# Patient Record
Sex: MALE | Race: WHITE | Employment: STUDENT | URBAN - NONMETROPOLITAN AREA
[De-identification: names, ages, dates, MRNs, and addresses within clinical notes are randomized per-mention and may not be internally consistent; named-entity substitution may affect disease eponyms.]

---

## 2017-07-11 ENCOUNTER — OFFICE VISIT (OUTPATIENT)
Dept: FAMILY MEDICINE | Facility: OTHER | Age: 22
End: 2017-07-11
Attending: PHYSICIAN ASSISTANT
Payer: COMMERCIAL

## 2017-07-11 VITALS
HEART RATE: 64 BPM | TEMPERATURE: 96.8 F | DIASTOLIC BLOOD PRESSURE: 70 MMHG | RESPIRATION RATE: 16 BRPM | OXYGEN SATURATION: 97 % | BODY MASS INDEX: 25.03 KG/M2 | HEIGHT: 74 IN | WEIGHT: 195 LBS | SYSTOLIC BLOOD PRESSURE: 116 MMHG

## 2017-07-11 DIAGNOSIS — Z00.00 ROUTINE HISTORY AND PHYSICAL EXAMINATION OF ADULT: ICD-10-CM

## 2017-07-11 DIAGNOSIS — Z71.89 ACP (ADVANCE CARE PLANNING): Chronic | ICD-10-CM

## 2017-07-11 DIAGNOSIS — J40 BRONCHITIS: Primary | ICD-10-CM

## 2017-07-11 PROCEDURE — 99395 PREV VISIT EST AGE 18-39: CPT | Performed by: PHYSICIAN ASSISTANT

## 2017-07-11 RX ORDER — AZITHROMYCIN 250 MG/1
TABLET, FILM COATED ORAL
Qty: 6 TABLET | Refills: 0 | Status: SHIPPED | OUTPATIENT
Start: 2017-07-11 | End: 2017-08-22

## 2017-07-11 ASSESSMENT — ANXIETY QUESTIONNAIRES
5. BEING SO RESTLESS THAT IT IS HARD TO SIT STILL: NOT AT ALL
3. WORRYING TOO MUCH ABOUT DIFFERENT THINGS: NOT AT ALL
2. NOT BEING ABLE TO STOP OR CONTROL WORRYING: NOT AT ALL
1. FEELING NERVOUS, ANXIOUS, OR ON EDGE: NOT AT ALL
6. BECOMING EASILY ANNOYED OR IRRITABLE: NOT AT ALL
GAD7 TOTAL SCORE: 0
7. FEELING AFRAID AS IF SOMETHING AWFUL MIGHT HAPPEN: NOT AT ALL

## 2017-07-11 ASSESSMENT — PAIN SCALES - GENERAL: PAINLEVEL: NO PAIN (0)

## 2017-07-11 ASSESSMENT — PATIENT HEALTH QUESTIONNAIRE - PHQ9: 5. POOR APPETITE OR OVEREATING: NOT AT ALL

## 2017-07-11 NOTE — NURSING NOTE
"Chief Complaint   Patient presents with     Physical     has a form to sign for [employer]     *_* Health Care Directive *_*     declined info       Initial /70 (BP Location: Right arm, Patient Position: Chair, Cuff Size: Adult Large)  Pulse 64  Temp 96.8  F (36  C) (Tympanic)  Resp 16  Ht 6' 2.25\" (1.886 m)  Wt 195 lb (88.5 kg)  SpO2 97%  BMI 24.87 kg/m2 Estimated body mass index is 24.87 kg/(m^2) as calculated from the following:    Height as of this encounter: 6' 2.25\" (1.886 m).    Weight as of this encounter: 195 lb (88.5 kg).  Medication Reconciliation: complete   Brit Hickman LPN      "

## 2017-07-11 NOTE — MR AVS SNAPSHOT
"              After Visit Summary   7/11/2017    Pako Nair    MRN: [id number]           Patient Information     Date Of Birth          1995        Visit Information        Provider Department      7/11/2017 2:30 PM Zaria Linton PA [facility]        Today's Diagnoses     Bronchitis    -  1    ACP (advance care planning)        Routine history and physical examination of adult           Follow-ups after your visit        Who to contact     If you have questions or need follow up information about today's clinic visit or your schedule please contact [facility] directly at [phone].  Normal or non-critical lab and imaging results will be communicated to you by MyChart, letter or phone within 4 business days after the clinic has received the results. If you do not hear from us within 7 days, please contact the clinic through MyChart or phone. If you have a critical or abnormal lab result, we will notify you by phone as soon as possible.  Submit refill requests through [employer] or call your pharmacy and they will forward the refill request to us. Please allow 3 business days for your refill to be completed.          Additional Information About Your Visit        Care EveryWhere ID     This is your Care EveryWhere ID. This could be used by other organizations to access your [place] medical records  [id number]        Your Vitals Were     Pulse Temperature Respirations Height Pulse Oximetry BMI (Body Mass Index)    64 96.8  F (36  C) (Tympanic) 16 6' 2.25\" (1.886 m) 97% 24.87 kg/m2       Blood Pressure from Last 3 Encounters:   07/11/17 116/70   07/14/16 104/64   08/07/15 104/68    Weight from Last 3 Encounters:   07/11/17 195 lb (88.5 kg)   07/14/16 200 lb (90.7 kg)   08/07/15 190 lb (86.2 kg)              Today, you had the following     No orders found for display         Today's Medication Changes          These changes are accurate as of: 7/11/17  3:33 PM.  If you have " any questions, ask your nurse or doctor.               Start taking these medicines.        Dose/Directions    azithromycin 250 MG tablet   Commonly known as:  ZITHROMAX   Used for:  Bronchitis   Started by:  Zaria Linton PA        Two tablets first day, then one tablet daily for four days.   Quantity:  6 tablet   Refills:  0            Where to get your medicines      These medications were sent to [id number] - [place], [place] - [address]   [address] , [place] [address]     Phone:  [phone]     azithromycin 250 MG tablet                Primary Care Provider Office Phone # Fax #    Pratik Morse [phone]       [facility] [address]  [facility] [address]        Equal Access to Services     ZAHEER ASH : Hadii luke ku hadasho Soomaali, waaxda luqadaha, qaybta kaalmada adeegyada, waxay matin haysabinen nabeel harrison . So [facility] [phone].    ATENCIÓN: Si habla español, tiene a wyman disposición servicios gratuitos de asistencia lingüística. LlSelect [facility] [phone].    We comply with applicable federal civil rights laws and [place] laws. We do not discriminate on the basis of race, color, national origin, age, disability sex, sexual orientation or gender identity.            Thank you!     Thank you for choosing [facility]  for your care. Our goal is always to provide you with excellent care. Hearing back from our patients is one way we can continue to improve our services. Please take a few minutes to complete the written survey that you may receive in the mail after your visit with us. Thank you!             Your Updated Medication List - Protect others around you: Learn how to safely use, store and throw away your medicines at www.disposemymeds.org.          This list is accurate as of: 7/11/17  3:33 PM.  Always use your most recent med list.                   Brand Name Dispense Instructions for use Diagnosis    azithromycin 250 MG tablet     ZITHROMAX    6 tablet    Two tablets first day, then one tablet daily for four days.    Bronchitis

## 2017-07-11 NOTE — PROGRESS NOTES
Subjective:  Pako Nair is a 22 year old male who presents for annual physical exam. 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 He has 2 week hisotry of cough, nasal congestion. No fever    Past Medical History:   Diagnosis Date     Routine infant or child health check 6/13/2000     Unspecified otitis media 3/1/2001       History reviewed. No pertinent surgical history.    Family History   Problem Relation Age of Onset     HEART DISEASE Maternal Grandfather        Social History   Substance Use Topics     Smoking status: Never Smoker     Smokeless tobacco: Never Used     Alcohol use No       No current outpatient prescriptions on file.     No current facility-administered medications for this visit.        No Known Allergies    Review of Systems:  Gen: negative for fever, chills, change in weight  Derm: negative for worrisome rashes, moles or lesions  Eyes: negative for vision changes or irritation  ENT: negative for ear, mouth and throat problems  Resp: negative for SOB  CV: negative for chest pain, palpitations   GI: negative for nausea, abdominal pain, heartburn, or change in bowel habits  : negative for dysuria, hematuria  Musculoskeletal: negative for significant arthralgias or myalgia  Neuro: negative for weakness, dizziness or paresthesias  Endo: negative for temperature intolerance, skin/hair changes  Psych: negative for changes in mood or affect    Objective:  B/P: 116/70, T: 96.8, P: 64, R: 16  195 lbs 0 oz Body mass index is 24.87 kg/(m^2).           Physical Exam:  Constitutional: healthy, alert and no distress  Skin: No suspicious rash or skin lesion  Head: Normocephalic. No masses, lesions, tenderness or abnormalities  ENT: Oropharynx moist and pink. EAC's and TM's intact. No cervical lymphadenopathy. No thyromegaly  CV: RRR. No murmur. No pretibial edema  Pulm: Scattered rhonchi. No rales or wheeze  GI: Abdomen soft, non-tender. BS normal. No masses, organomegaly. No rebound or guarding.No  hernia  Musculoskeletal: extremities normal- no gross deformities noted, gait normal and normal muscle tone  Neuroc: Gait normal. Reflexes normal and symmetric. Sensation grossly WNL. CN 2-12 intact  Psych: Euthymic mood with corresponding affect.        Assessment and Plan:    (J40) Bronchitis  (primary encounter diagnosis)  Plan: azithromycin (ZITHROMAX) 250 MG tablet            (Z71.89) ACP (advance care planning)      (Z00.00) Routine history and physical examination of adult  Comment: Healthy, other than URI  Plan: Form completed for college      Zaria Linton PA-C

## 2017-07-12 ASSESSMENT — PATIENT HEALTH QUESTIONNAIRE - PHQ9: SUM OF ALL RESPONSES TO PHQ QUESTIONS 1-9: 0

## 2017-07-12 ASSESSMENT — ANXIETY QUESTIONNAIRES: GAD7 TOTAL SCORE: 0

## 2017-08-22 ENCOUNTER — OFFICE VISIT (OUTPATIENT)
Dept: FAMILY MEDICINE | Facility: OTHER | Age: 22
End: 2017-08-22
Attending: FAMILY MEDICINE
Payer: COMMERCIAL

## 2017-08-22 VITALS
HEART RATE: 53 BPM | HEIGHT: 74 IN | TEMPERATURE: 98.3 F | RESPIRATION RATE: 16 BRPM | BODY MASS INDEX: 25.03 KG/M2 | SYSTOLIC BLOOD PRESSURE: 102 MMHG | DIASTOLIC BLOOD PRESSURE: 70 MMHG | OXYGEN SATURATION: 99 % | WEIGHT: 195 LBS

## 2017-08-22 DIAGNOSIS — N52.9 ERECTILE DYSFUNCTION, UNSPECIFIED ERECTILE DYSFUNCTION TYPE: Primary | ICD-10-CM

## 2017-08-22 PROCEDURE — 99213 OFFICE O/P EST LOW 20 MIN: CPT | Performed by: FAMILY MEDICINE

## 2017-08-22 RX ORDER — SILDENAFIL 100 MG/1
50-100 TABLET, FILM COATED ORAL DAILY PRN
Qty: 3 TABLET | Refills: 1 | Status: SHIPPED | OUTPATIENT
Start: 2017-08-22

## 2017-08-22 ASSESSMENT — ANXIETY QUESTIONNAIRES
GAD7 TOTAL SCORE: 0
5. BEING SO RESTLESS THAT IT IS HARD TO SIT STILL: NOT AT ALL
7. FEELING AFRAID AS IF SOMETHING AWFUL MIGHT HAPPEN: NOT AT ALL
4. TROUBLE RELAXING: NOT AT ALL
2. NOT BEING ABLE TO STOP OR CONTROL WORRYING: NOT AT ALL
1. FEELING NERVOUS, ANXIOUS, OR ON EDGE: NOT AT ALL
3. WORRYING TOO MUCH ABOUT DIFFERENT THINGS: NOT AT ALL
6. BECOMING EASILY ANNOYED OR IRRITABLE: NOT AT ALL

## 2017-08-22 ASSESSMENT — PAIN SCALES - GENERAL: PAINLEVEL: NO PAIN (0)

## 2017-08-22 ASSESSMENT — PATIENT HEALTH QUESTIONNAIRE - PHQ9: SUM OF ALL RESPONSES TO PHQ QUESTIONS 1-9: 0

## 2017-08-22 NOTE — PROGRESS NOTES
Pako Nair    August 22, 2017    Chief Complaint   Patient presents with     Abdominal Pain     intermittent pain during exercise       SUBJECTIVE:  Patient has some ED at times.  Only prior to intercourse.  He can achieve erection easily unless he has to perform.  No trouble if he had a couple of drinks.  No trouble with foreplay, just the actual act.  We discussed options.  See below.     Past Medical History:   Diagnosis Date     Routine infant or child health check 6/13/2000     Unspecified otitis media 3/1/2001       History reviewed. No pertinent surgical history.    Current Outpatient Prescriptions   Medication Sig Dispense Refill     sildenafil (VIAGRA) 100 MG cap/tab Take 0.5-1 tablets ( mg) by mouth daily as needed for erectile dysfunction Take 30 min to 4 hours before intercourse.  Never use with nitroglycerin, terazosin or doxazosin. 3 tablet 1       No Known Allergies    Family History   Problem Relation Age of Onset     HEART DISEASE Maternal Grandfather        Social History     Social History     Marital status: Single     Spouse name: N/A     Number of children: N/A     Years of education: N/A     Occupational History     Not on file.     Social History Main Topics     Smoking status: Never Smoker     Smokeless tobacco: Never Used     Alcohol use No     Drug use: No     Sexual activity: Yes     Partners: Female     Birth control/ protection: Condom     Other Topics Concern     Parent/Sibling W/ Cabg, Mi Or Angioplasty Before 65f 55m? No     Social History Narrative    Age 17    Primary language English               5 point ROS negative except as noted above in HPI, including Gen., Resp., CV, GI &  system review.     OBJECTIVE:  B/P: 102/70, T: 98.3, P: 53, R: 16    GENERAL APPEARANCE: Alert, no acute distress  Genitals:  Normal male.  Normal testes.  Penis normal.  No hernias.    SKIN: no suspicious lesions or rashes to visualized skin  NEURO: Alert, oriented x 3, speech and mentation  normal    ASSESSMENT and PLAN:  (N52.9) Erectile dysfunction, unspecified erectile dysfunction type  (primary encounter diagnosis)  Comment: situational.   Plan: sildenafil (VIAGRA) 100 MG cap/tab        Discussed options.  Counseling, meds for anxiety all discussed and doesn't seem right to me or Sergio.  Going to go with a trial of viagra, minimal, to see if he can get through the psychological difficulties of anxiety with intercourse.  He will f/u with ongoing concerns.

## 2017-08-22 NOTE — NURSING NOTE
"Chief Complaint   Patient presents with     Abdominal Pain     intermittent pain during exercise       Initial /70 (BP Location: Right arm, Patient Position: Sitting, Cuff Size: Adult Large)  Pulse 53  Temp 98.3  F (36.8  C) (Tympanic)  Resp 16  Ht 6' 2\" (1.88 m)  Wt 195 lb (88.5 kg)  SpO2 99%  BMI 25.04 kg/m2 Estimated body mass index is 25.04 kg/(m^2) as calculated from the following:    Height as of this encounter: 6' 2\" (1.88 m).    Weight as of this encounter: 195 lb (88.5 kg).  Medication Reconciliation: complete     Jacqui Hill      "

## 2017-08-23 ASSESSMENT — ANXIETY QUESTIONNAIRES: GAD7 TOTAL SCORE: 0

## 2018-04-11 ENCOUNTER — HOSPITAL ENCOUNTER (EMERGENCY)
Facility: HOSPITAL | Age: 23
Discharge: HOME | End: 2018-04-11
Attending: PHYSICIAN ASSISTANT | Admitting: PHYSICIAN ASSISTANT
Payer: COMMERCIAL

## 2018-04-11 ENCOUNTER — APPOINTMENT (OUTPATIENT)
Dept: GENERAL RADIOLOGY | Facility: HOSPITAL | Age: 23
End: 2018-04-11
Attending: PHYSICIAN ASSISTANT
Payer: COMMERCIAL

## 2018-04-11 VITALS
RESPIRATION RATE: 16 BRPM | SYSTOLIC BLOOD PRESSURE: 126 MMHG | OXYGEN SATURATION: 98 % | DIASTOLIC BLOOD PRESSURE: 78 MMHG | TEMPERATURE: 96.6 F

## 2018-04-11 DIAGNOSIS — M62.830 BACK MUSCLE SPASM: ICD-10-CM

## 2018-04-11 PROCEDURE — 25000128 H RX IP 250 OP 636: Performed by: PHYSICIAN ASSISTANT

## 2018-04-11 PROCEDURE — 72100 X-RAY EXAM L-S SPINE 2/3 VWS: CPT | Mod: TC

## 2018-04-11 PROCEDURE — 96372 THER/PROPH/DIAG INJ SC/IM: CPT

## 2018-04-11 PROCEDURE — 99213 OFFICE O/P EST LOW 20 MIN: CPT | Performed by: PHYSICIAN ASSISTANT

## 2018-04-11 PROCEDURE — G0463 HOSPITAL OUTPT CLINIC VISIT: HCPCS | Mod: 25

## 2018-04-11 PROCEDURE — 25000132 ZZH RX MED GY IP 250 OP 250 PS 637: Performed by: PHYSICIAN ASSISTANT

## 2018-04-11 RX ORDER — CYCLOBENZAPRINE HCL 10 MG
TABLET ORAL
Qty: 20 TABLET | Refills: 0 | Status: SHIPPED | OUTPATIENT
Start: 2018-04-11

## 2018-04-11 RX ORDER — DIAZEPAM 5 MG
10 TABLET ORAL ONCE
Status: COMPLETED | OUTPATIENT
Start: 2018-04-11 | End: 2018-04-11

## 2018-04-11 RX ORDER — PREDNISONE 20 MG/1
TABLET ORAL
Qty: 10 TABLET | Refills: 0 | Status: SHIPPED | OUTPATIENT
Start: 2018-04-11

## 2018-04-11 RX ORDER — KETOROLAC TROMETHAMINE 30 MG/ML
60 INJECTION, SOLUTION INTRAMUSCULAR; INTRAVENOUS ONCE
Status: COMPLETED | OUTPATIENT
Start: 2018-04-11 | End: 2018-04-11

## 2018-04-11 RX ADMIN — DIAZEPAM 10 MG: 5 TABLET ORAL at 14:33

## 2018-04-11 RX ADMIN — KETOROLAC TROMETHAMINE 60 MG: 30 INJECTION, SOLUTION INTRAMUSCULAR at 14:33

## 2018-04-11 ASSESSMENT — ENCOUNTER SYMPTOMS
MYALGIAS: 1
PSYCHIATRIC NEGATIVE: 1
CARDIOVASCULAR NEGATIVE: 1
BACK PAIN: 1
CONSTITUTIONAL NEGATIVE: 1

## 2018-04-11 NOTE — ED AVS SNAPSHOT
[facility]    [address]    [facility] [address]    Phone:  [phone]                                       Pako Nair   MRN: [id number]    Department:  [facility]   Date of Visit:  4/11/2018           Patient Information     Date Of Birth          1995        Your diagnoses for this visit were:     Back muscle spasm        You were seen by Marylou William PA.      Follow-up Information     Follow up with Pratik Morse MD.    Specialty:  Family Practice    Why:  If symptoms worsen or if not resolving. A Chiropractor may a good idea, as well.    Contact information:    [address] [facility] [address] [phone]          Follow up with [facility].    Specialty:  EMERGENCY MEDICINE    Why:  If further concerns develop    Contact information:    [address] [address] [phone]    Additional information:    From [place]: Take [address]. Turn left at [address]. Turn left at the first stoplight on [address]. At the first stop sign, take a right onto [address]. Take a left into the parking lot and continue through until you reach the North enterance of the building.       From [place]: Take [address]. Take the [address] ramp towards [place]. Turn left onto [address]. Take a slight right onto [address] [facility]. Turn left at the first stoplight on [address]. At the first stop sign, take a right onto [address]. Take a left into the parking lot and continue through until you reach the North enterance of the building.       From [place]: Take [address]. Take a right at [address]. At the first stop sign, take a right onto [address]. Take a left into the parking lot and continue through until you reach the North enterance of the building.       Discharge References/Attachments     BACK SAFETY: POOR POSTURE HURTS (ENGLISH)    BACK  SPASM, NO TRAUMA (ENGLISH)    BACK, CARING FOR THROUGHOUT THE DAY (ENGLISH)    BASICS OF GOOD POSTURE, BACK SAFETY: (ENGLISH)         Review of your medicines      START taking        Dose / Directions Last dose taken    cyclobenzaprine 10 MG tablet   Commonly known as:  FLEXERIL   Quantity:  20 tablet        Take half to one tablet every 8 hours. Do not drive after taking.   Refills:  0        predniSONE 20 MG tablet   Commonly known as:  DELTASONE   Quantity:  10 tablet        Take two tablets (= 40mg) each day for 5 (five) days   Refills:  0          Our records show that you are taking the medicines listed below. If these are incorrect, please call your family doctor or clinic.        Dose / Directions Last dose taken    sildenafil 100 MG tablet   Commonly known as:  VIAGRA   Dose:   mg   Quantity:  3 tablet        Take 0.5-1 tablets ( mg) by mouth daily as needed for erectile dysfunction Take 30 min to 4 hours before intercourse.  Never use with nitroglycerin, terazosin or doxazosin.   Refills:  1                Prescriptions were sent or printed at these locations (2 Prescriptions)                   [address] [address] [facility] [address]   [address] [address] [address]    Telephone:  [phone]   Fax:  [phone]   Hours:                  E-Prescribed (2 of 2)         cyclobenzaprine (FLEXERIL) 10 MG tablet               predniSONE (DELTASONE) 20 MG tablet                Procedures and tests performed during your visit     Lumbar spine XR, 2-3 views      Orders Needing Specimen Collection     None      Pending Results     No orders found from 4/9/2018 to 4/12/2018.            Pending Culture Results     No orders found from 4/9/2018 to 4/12/2018.            Thank you for choosing [facility]       Thank you for choosing [facility] for your care. Our goal is always to provide you with excellent care. Hearing back from our patients is one way we can continue to improve our services.  "Please take a few minutes to complete the written survey that you may receive in the mail after you visit with us. Thank you!        [employer] Information     [employer] lets you send messages to your doctor, view your test results, renew your prescriptions, schedule appointments and more. To sign up, go to [email or web address] . Click on \"Log in\" on the left side of the screen, which will take you to the Welcome page. Then click on \"Sign up Now\" on the right side of the page.     You will be asked to enter the access code listed below, as well as some personal information. Please follow the directions to create your username and password.     Your access code is: [email or web address]  Expires: 7/10/2018  2:44 PM     Your access code will  in 90 days. If you need help or a new code, please call your [place] clinic or [phone].        Care EveryWhere ID     This is your Care EveryWhere ID. This could be used by other organizations to access your [place] medical records  [id number]        Equal Access to Services     [facility] C. Frem[facility] HospitalRACHEL : Hadii luke William, wamatt osman, qazay pickens, airam harrison . So [facility] [phone].    ATENCIÓN: Si habla español, tiene a wyman disposición servicios gratuitos de asistencia lingüística. Llame al [phone].    We comply with applicable federal civil rights laws and [place] laws. We do not discriminate on the basis of race, color, national origin, age, disability, sex, sexual orientation, or gender identity.            After Visit Summary       This is your record. Keep this with you and show to your community pharmacist(s) and doctor(s) at your next visit.                  "

## 2018-04-11 NOTE — ED AVS SNAPSHOT
[facility]    [address]    [place] [address]    Phone:  [phone]                                       Pako Nair   MRN: [id number]    Department:  [facility]   Date of Visit:  4/11/2018           After Visit Summary Signature Page     I have received my discharge instructions, and my questions have been answered. I have discussed any challenges I see with this plan with the nurse or doctor.    ..........................................................................................................................................  Patient/Patient Representative Signature      ..........................................................................................................................................  Patient Representative Print Name and Relationship to Patient    ..................................................               ................................................  Date                                            Time    ..........................................................................................................................................  Reviewed by Signature/Title    ...................................................              ..............................................  Date                                                            Time

## 2018-04-11 NOTE — ED NOTES
Pt presents today alone for c/o back pain after lifting weights and his back went out admits he did take 1 tylenol #3 since this happened.

## 2018-04-11 NOTE — ED PROVIDER NOTES
History     Chief Complaint   Patient presents with     Back Pain     c/o lower back pain x 1 hr, notes lifting wts     The history is provided by the patient. No  was used.     Pako Nair is a 22 year old male who has acute LBP. He was just lifting weights and he buckled to his knees when he was doing a standing lift.  no loss of leg strength. No loss of b/b control. No head or neck pan/injury.  States he has to walk slow and get up and out of chair very slow, due to the LBP. States he took a T#3 1 hour ago for the pain.    Problem List:    Patient Active Problem List    Diagnosis Date Noted     ACP (advance care planning) 07/11/2017     Priority: Medium     Advance Care Planning 7/11/2017: ACP Review of Chart / Resources Provided:  Reviewed chart for advance care plan.  Pako Nair has no plan or code status on file. Discussed available resources and provided with information.   Added by Brit Hickman      Advance Care Planning 7/14/2016: ACP Review of Chart / Resources Provided:  Reviewed chart for advance care plan.  Pako Nair has no plan or code status on file. Discussed available resources and provided with information. Confirmed code status reflects current choices pending further ACP discussions.  Confirmed/documented legally designated decision makers.  Added by Brti Hickman                Past Medical History:    Past Medical History:   Diagnosis Date     Routine infant or child health check 6/13/2000     Unspecified otitis media 3/1/2001       Past Surgical History:    History reviewed. No pertinent surgical history.    Family History:    Family History   Problem Relation Age of Onset     HEART DISEASE Maternal Grandfather        Social History:  Marital Status:  Single [1]  Social History   Substance Use Topics     Smoking status: Never Smoker     Smokeless tobacco: Never Used     Alcohol use No        Medications:      cyclobenzaprine (FLEXERIL) 10 MG tablet    predniSONE (DELTASONE) 20 MG tablet   sildenafil (VIAGRA) 100 MG cap/tab         Review of Systems   Constitutional: Negative.    Cardiovascular: Negative.    Musculoskeletal: Positive for back pain, gait problem and myalgias.   Psychiatric/Behavioral: Negative.        Physical Exam   BP: 126/78  Heart Rate: 86  Temp: 96.6  F (35.9  C)  Resp: 16  SpO2: 98 %      Physical Exam   Constitutional: He is oriented to person, place, and time. He appears well-developed and well-nourished. No distress.   Cardiovascular: Normal rate.    Pulmonary/Chest: Effort normal.   Musculoskeletal:   Back: no e/e/e/e, moderate TTP to mid lumbar muscles, bilaterally. M/n/v intact. Pt guarding against flexion    BLE: +AFROM, 5/5 strength, m/n/v intact   Neurological: He is alert and oriented to person, place, and time.   Skin: He is not diaphoretic.   Psychiatric: He has a normal mood and affect.   Nursing note and vitals reviewed.      ED Course     ED Course     Procedures          Results for orders placed or performed during the hospital encounter of 04/11/18 (from the past 24 hour(s))   Lumbar spine XR, 2-3 views    Narrative    Exam: XR LUMBAR SPINE 2-3 VIEWS     History:Male, age 22 years, pain, was dead lifting weights when the  pain started today;     Comparison:  None    Technique: Three views are submitted.    Findings: Bones are normally mineralized. No evidence of acute or  subacute fracture. No evidence of acute subluxation.           Impression    Impression:  1.  No evidence of acute or subacute bony abnormality.     2.  Lumbar spine straightening suggesting muscle spasm.    LAUREEN DUMONT MD       Medications   ketorolac (TORADOL) injection 60 mg (60 mg Intramuscular Given 4/11/18 1433)   diazepam (VALIUM) tablet 10 mg (10 mg Oral Given 4/11/18 1433)   pt observed x 20 minutes. Pt tolerated well        Assessments & Plan (with Medical Decision Making)     I have reviewed the nursing notes.    I have reviewed the  findings, diagnosis, plan and need for follow up with the patient.      Discharge Medication List as of 4/11/2018  2:44 PM      START taking these medications    Details   cyclobenzaprine (FLEXERIL) 10 MG tablet Take half to one tablet every 8 hours. Do not drive after taking., Disp-20 tablet, R-0, E-Prescribe      predniSONE (DELTASONE) 20 MG tablet Take two tablets (= 40mg) each day for 5 (five) days, Disp-10 tablet, R-0, E-Prescribe             Final diagnoses:   Back muscle spasm           Patient verbally educated and given appropriate education sheets for the diagnoses and has no questions.  Take medications as directed.   Follow up with your Primary Care provider if symptoms increase or if not resolving in next week.  You also may benefit for an adjustment by your Chiropractor.  if further concerns develop, return to the ER  Marylou William Certified  Physician Assistant  4/11/2018  4:22 PM  URGENT CARE CLINIC      4/11/2018   [facility]     Marylou William PA  04/11/18 2298

## 2019-08-11 NOTE — MR AVS SNAPSHOT
"              After Visit Summary   8/22/2017    Pako Nair    MRN: [id number]           Patient Information     Date Of Birth          1995        Visit Information        Provider Department      8/22/2017 3:00 PM Pratik Morse MD [facility]        Today's Diagnoses     Erectile dysfunction, unspecified erectile dysfunction type    -  1      Care Instructions    F/u with ongoing concerns.           Follow-ups after your visit        Who to contact     If you have questions or need follow up information about today's clinic visit or your schedule please contact [facility] directly at [phone].  Normal or non-critical lab and imaging results will be communicated to you by MyChart, letter or phone within 4 business days after the clinic has received the results. If you do not hear from us within 7 days, please contact the clinic through MyChart or phone. If you have a critical or abnormal lab result, we will notify you by phone as soon as possible.  Submit refill requests through [employer] or call your pharmacy and they will forward the refill request to us. Please allow 3 business days for your refill to be completed.          Additional Information About Your Visit        Care EveryWhere ID     This is your Care EveryWhere ID. This could be used by other organizations to access your [facility] medical records  [id number]        Your Vitals Were     Pulse Temperature Respirations Height Pulse Oximetry BMI (Body Mass Index)    53 98.3  F (36.8  C) (Tympanic) 16 6' 2\" (1.88 m) 99% 25.04 kg/m2       Blood Pressure from Last 3 Encounters:   08/22/17 102/70   07/11/17 116/70   07/14/16 104/64    Weight from Last 3 Encounters:   08/22/17 195 lb (88.5 kg)   07/11/17 195 lb (88.5 kg)   07/14/16 200 lb (90.7 kg)              Today, you had the following     No orders found for display         Today's Medication Changes          These changes are accurate as of: 8/22/17  3:24 PM.  " If you have any questions, ask your nurse or doctor.               Start taking these medicines.        Dose/Directions    sildenafil 100 MG cap/tab   Commonly known as:  VIAGRA   Used for:  Erectile dysfunction, unspecified erectile dysfunction type   Started by:  Pratik Morse MD        Dose:   mg   Take 0.5-1 tablets ( mg) by mouth daily as needed for erectile dysfunction Take 30 min to 4 hours before intercourse.  Never use with nitroglycerin, terazosin or doxazosin.   Quantity:  3 tablet   Refills:  1         Stop taking these medicines if you haven't already. Please contact your care team if you have questions.     azithromycin 250 MG tablet   Commonly known as:  ZITHROMAX   Stopped by:  Pratik Morse MD                Where to get your medicines      Some of these will need a paper prescription and others can be bought over the counter.  Ask your nurse if you have questions.     Bring a paper prescription for each of these medications     sildenafil 100 MG cap/tab                Primary Care Provider Office Phone # Fax #    Pratik Morse [phone]       [address] [address]        Equal Access to Services     [facility]: Hadii aad ku hadasho Soomaali, waaxda luqadaha, qaybta kaalmada adeegyada, waxay bailey harrison . So [facility] [phone].    ATENCIÓN: Si habla español, tiene a wyman disposición servicios gratuitos de asistencia lingüística. Latrice al [phone].    We comply with applicable federal civil rights laws and [place] laws. We do not discriminate on the basis of race, color, national origin, age, disability sex, sexual orientation or gender identity.            Thank you!     Thank you for choosing [facility]  for your care. Our goal is always to provide you with excellent care. Hearing back from our patients is one way we can continue to improve our services. Please take a few  minutes to complete the written survey that you may receive in the mail after your visit with us. Thank you!             Your Updated Medication List - Protect others around you: Learn how to safely use, store and throw away your medicines at www.disposemymeds.org.          This list is accurate as of: 8/22/17  3:24 PM.  Always use your most recent med list.                   Brand Name Dispense Instructions for use Diagnosis    sildenafil 100 MG cap/tab    VIAGRA    3 tablet    Take 0.5-1 tablets ( mg) by mouth daily as needed for erectile dysfunction Take 30 min to 4 hours before intercourse.  Never use with nitroglycerin, terazosin or doxazosin.    Erectile dysfunction, unspecified erectile dysfunction type          none